# Patient Record
Sex: FEMALE | Race: WHITE | NOT HISPANIC OR LATINO | Employment: OTHER | URBAN - METROPOLITAN AREA
[De-identification: names, ages, dates, MRNs, and addresses within clinical notes are randomized per-mention and may not be internally consistent; named-entity substitution may affect disease eponyms.]

---

## 2021-12-04 ENCOUNTER — HOSPITAL ENCOUNTER (EMERGENCY)
Facility: MEDICAL CENTER | Age: 61
End: 2021-12-04
Attending: EMERGENCY MEDICINE

## 2021-12-04 ENCOUNTER — APPOINTMENT (OUTPATIENT)
Dept: RADIOLOGY | Facility: MEDICAL CENTER | Age: 61
End: 2021-12-04
Attending: EMERGENCY MEDICINE

## 2021-12-04 VITALS
OXYGEN SATURATION: 92 % | HEART RATE: 78 BPM | DIASTOLIC BLOOD PRESSURE: 77 MMHG | WEIGHT: 115.74 LBS | TEMPERATURE: 97 F | SYSTOLIC BLOOD PRESSURE: 124 MMHG | HEIGHT: 58 IN | RESPIRATION RATE: 16 BRPM | BODY MASS INDEX: 24.3 KG/M2

## 2021-12-04 DIAGNOSIS — K85.90 ACUTE PANCREATITIS, UNSPECIFIED COMPLICATION STATUS, UNSPECIFIED PANCREATITIS TYPE: ICD-10-CM

## 2021-12-04 LAB
ALBUMIN SERPL BCP-MCNC: 4.8 G/DL (ref 3.2–4.9)
ALBUMIN/GLOB SERPL: 1.8 G/DL
ALP SERPL-CCNC: 59 U/L (ref 30–99)
ALT SERPL-CCNC: 22 U/L (ref 2–50)
ANION GAP SERPL CALC-SCNC: 13 MMOL/L (ref 7–16)
APPEARANCE UR: CLEAR
AST SERPL-CCNC: 22 U/L (ref 12–45)
BACTERIA #/AREA URNS HPF: NEGATIVE /HPF
BASOPHILS # BLD AUTO: 0.2 % (ref 0–1.8)
BASOPHILS # BLD: 0.02 K/UL (ref 0–0.12)
BILIRUB SERPL-MCNC: 0.2 MG/DL (ref 0.1–1.5)
BILIRUB UR QL STRIP.AUTO: NEGATIVE
BUN SERPL-MCNC: 12 MG/DL (ref 8–22)
CALCIUM SERPL-MCNC: 9.4 MG/DL (ref 8.5–10.5)
CHLORIDE SERPL-SCNC: 104 MMOL/L (ref 96–112)
CO2 SERPL-SCNC: 22 MMOL/L (ref 20–33)
COLOR UR: YELLOW
CREAT SERPL-MCNC: 0.55 MG/DL (ref 0.5–1.4)
EOSINOPHIL # BLD AUTO: 0.01 K/UL (ref 0–0.51)
EOSINOPHIL NFR BLD: 0.1 % (ref 0–6.9)
EPI CELLS #/AREA URNS HPF: NEGATIVE /HPF
ERYTHROCYTE [DISTWIDTH] IN BLOOD BY AUTOMATED COUNT: 48.4 FL (ref 35.9–50)
GLOBULIN SER CALC-MCNC: 2.7 G/DL (ref 1.9–3.5)
GLUCOSE SERPL-MCNC: 119 MG/DL (ref 65–99)
GLUCOSE UR STRIP.AUTO-MCNC: NEGATIVE MG/DL
HCT VFR BLD AUTO: 41.4 % (ref 37–47)
HGB BLD-MCNC: 13.9 G/DL (ref 12–16)
HYALINE CASTS #/AREA URNS LPF: NORMAL /LPF
IMM GRANULOCYTES # BLD AUTO: 0.12 K/UL (ref 0–0.11)
IMM GRANULOCYTES NFR BLD AUTO: 1.1 % (ref 0–0.9)
KETONES UR STRIP.AUTO-MCNC: NEGATIVE MG/DL
LEUKOCYTE ESTERASE UR QL STRIP.AUTO: NEGATIVE
LIPASE SERPL-CCNC: 171 U/L (ref 11–82)
LYMPHOCYTES # BLD AUTO: 1.64 K/UL (ref 1–4.8)
LYMPHOCYTES NFR BLD: 14.7 % (ref 22–41)
MCH RBC QN AUTO: 32 PG (ref 27–33)
MCHC RBC AUTO-ENTMCNC: 33.6 G/DL (ref 33.6–35)
MCV RBC AUTO: 95.2 FL (ref 81.4–97.8)
MICRO URNS: ABNORMAL
MONOCYTES # BLD AUTO: 0.3 K/UL (ref 0–0.85)
MONOCYTES NFR BLD AUTO: 2.7 % (ref 0–13.4)
NEUTROPHILS # BLD AUTO: 9.06 K/UL (ref 2–7.15)
NEUTROPHILS NFR BLD: 81.2 % (ref 44–72)
NITRITE UR QL STRIP.AUTO: NEGATIVE
NRBC # BLD AUTO: 0 K/UL
NRBC BLD-RTO: 0 /100 WBC
PH UR STRIP.AUTO: 6.5 [PH] (ref 5–8)
PLATELET # BLD AUTO: 214 K/UL (ref 164–446)
PMV BLD AUTO: 10 FL (ref 9–12.9)
POTASSIUM SERPL-SCNC: 4.1 MMOL/L (ref 3.6–5.5)
PROT SERPL-MCNC: 7.5 G/DL (ref 6–8.2)
PROT UR QL STRIP: NEGATIVE MG/DL
RBC # BLD AUTO: 4.35 M/UL (ref 4.2–5.4)
RBC # URNS HPF: NORMAL /HPF
RBC UR QL AUTO: ABNORMAL
SODIUM SERPL-SCNC: 139 MMOL/L (ref 135–145)
SP GR UR STRIP.AUTO: 1.01
UROBILINOGEN UR STRIP.AUTO-MCNC: 0.2 MG/DL
WBC # BLD AUTO: 11.2 K/UL (ref 4.8–10.8)
WBC #/AREA URNS HPF: NORMAL /HPF

## 2021-12-04 PROCEDURE — 96374 THER/PROPH/DIAG INJ IV PUSH: CPT

## 2021-12-04 PROCEDURE — 74177 CT ABD & PELVIS W/CONTRAST: CPT

## 2021-12-04 PROCEDURE — 81001 URINALYSIS AUTO W/SCOPE: CPT

## 2021-12-04 PROCEDURE — 700111 HCHG RX REV CODE 636 W/ 250 OVERRIDE (IP): Performed by: EMERGENCY MEDICINE

## 2021-12-04 PROCEDURE — 83690 ASSAY OF LIPASE: CPT

## 2021-12-04 PROCEDURE — 80053 COMPREHEN METABOLIC PANEL: CPT

## 2021-12-04 PROCEDURE — 96376 TX/PRO/DX INJ SAME DRUG ADON: CPT

## 2021-12-04 PROCEDURE — 96375 TX/PRO/DX INJ NEW DRUG ADDON: CPT

## 2021-12-04 PROCEDURE — 700105 HCHG RX REV CODE 258: Performed by: EMERGENCY MEDICINE

## 2021-12-04 PROCEDURE — 85025 COMPLETE CBC W/AUTO DIFF WBC: CPT

## 2021-12-04 PROCEDURE — 99285 EMERGENCY DEPT VISIT HI MDM: CPT

## 2021-12-04 PROCEDURE — 700117 HCHG RX CONTRAST REV CODE 255: Performed by: EMERGENCY MEDICINE

## 2021-12-04 RX ORDER — ONDANSETRON 2 MG/ML
4 INJECTION INTRAMUSCULAR; INTRAVENOUS ONCE
Status: COMPLETED | OUTPATIENT
Start: 2021-12-04 | End: 2021-12-04

## 2021-12-04 RX ORDER — SODIUM CHLORIDE 9 MG/ML
1000 INJECTION, SOLUTION INTRAVENOUS ONCE
Status: COMPLETED | OUTPATIENT
Start: 2021-12-04 | End: 2021-12-04

## 2021-12-04 RX ORDER — MORPHINE SULFATE 4 MG/ML
4 INJECTION INTRAVENOUS ONCE
Status: COMPLETED | OUTPATIENT
Start: 2021-12-04 | End: 2021-12-04

## 2021-12-04 RX ADMIN — MORPHINE SULFATE 4 MG: 4 INJECTION INTRAVENOUS at 05:47

## 2021-12-04 RX ADMIN — IOHEXOL 80 ML: 350 INJECTION, SOLUTION INTRAVENOUS at 06:04

## 2021-12-04 RX ADMIN — SODIUM CHLORIDE 1000 ML: 9 INJECTION, SOLUTION INTRAVENOUS at 04:51

## 2021-12-04 RX ADMIN — ONDANSETRON 4 MG: 2 INJECTION INTRAMUSCULAR; INTRAVENOUS at 04:51

## 2021-12-04 RX ADMIN — MORPHINE SULFATE 4 MG: 4 INJECTION INTRAVENOUS at 04:51

## 2021-12-04 NOTE — ED PROVIDER NOTES
"ED Provider Note    CHIEF COMPLAINT  Chief Complaint   Patient presents with   • Abdominal Pain     RLQ pain x1 day; started as mild pain this AM and is now 9/10   • Nausea     since 2200; pt denies vomiting         HPI  Gifty Degroot is a 61 y.o. female who presents with abdominal pain.  Pain started yesterday morning.  Was present when she woke up.  Gradually worsened and is now severe sharp stabbing pain.  Worse when she straightens her leg or tries to walk.  Located right lower quadrant.  Does not radiate.  Associated nausea no vomiting.  Denies diarrhea.  Normal bowel movement.  No dysuria hematuria frequency.  No abnormal vaginal discharge or bleeding.    REVIEW OF SYSTEMS  As above  All other systems are negative.     PAST MEDICAL HISTORY  Past Medical History:   Diagnosis Date   • CAD (coronary artery disease)    • Chronic obstructive pulmonary disease ([facility])    • DDD (degenerative disc disease), lumbar    • Fibromyalgia    • NSTEMI (non-ST elevated myocardial infarction) ([facility])    • Renal disorder     Acute kidney failure from NSAID use   • Stenosis of cervical spine    • Stroke ([facility])     \"slightly more than a TIA\"; no lasting deficits   • Vertigo        FAMILY HISTORY  History reviewed. No pertinent family history.    SOCIAL HISTORY  Social History     Tobacco Use   • Smoking status: Former Smoker     Quit date:      Years since quittin.9   • Smokeless tobacco: Never Used   Vaping Use   • Vaping Use: Never used   Substance Use Topics   • Alcohol use: Yes     Comment: \"a single drink once a year\"   • Drug use: Yes     Types: Inhaled     Comment: marijuana       SURGICAL HISTORY  Past Surgical History:   Procedure Laterality Date   • STENT PLACEMENT  2016    cardiac stent x2   • CHOLECYSTECTOMY         CURRENT MEDICATIONS  Home Medications     Reviewed by Thalia Prado R.N. (Registered Nurse) on 21 at 0344  Med List Status: Not Addressed   Medication Last Dose Status      " "  Patient Herminio Taking any Medications                       ALLERGIES  Allergies   Allergen Reactions   • Nsaids Unspecified     Caused kidney failure   • Penicillins Hives   • Tramadol Hives       PHYSICAL EXAM  VITAL SIGNS: /81   Pulse (!) 118   Temp 36.9 °C (98.4 °F) (Temporal)   Resp 16   Ht 1.473 m (4' 10\")   Wt 52.5 kg (115 lb 11.9 oz)   SpO2 94%   BMI 24.19 kg/m²   Constitutional: Awake and alert  HENT: Dry mucous membranes  Eyes: Sclera white  Neck: Normal range of motion  Cardiovascular: Tachycardic  heart rate, Normal rhythm  Thorax & Lungs: Normal breath sounds, No respiratory distress, No wheezing, No chest tenderness.   Abdomen: Tenderness to palpation in the right side of the abdomen worse in the right lower quadrant.  Mild diffuse tenderness otherwise.  There is guarding.  Skin: No rash.   Back: No tenderness, No CVA tenderness.   Extremities: Intact, symmetric distal pulses, no edema.  Neurologic: Grossly normal    RADIOLOGY/PROCEDURES  CT-ABDOMEN-PELVIS WITH   Final Result         1.  Intrahepatic, common bile duct, and mild pancreatic ductal dilatation. Could correspond with postcholecystectomy physiology, however workup and evaluation for causes of biliary obstruction are recommended as clinically appropriate which could include    ERCP or MRCP.   2.  Hepatomegaly   3.  Atherosclerosis and atherosclerotic coronary artery disease         Imaging is interpreted by radiologist    Labs:   Results for orders placed or performed during the hospital encounter of 12/04/21   CBC WITH DIFFERENTIAL   Result Value Ref Range    WBC 11.2 (H) 4.8 - 10.8 K/uL    RBC 4.35 4.20 - 5.40 M/uL    Hemoglobin 13.9 12.0 - 16.0 g/dL    Hematocrit 41.4 37.0 - 47.0 %    MCV 95.2 81.4 - 97.8 fL    MCH 32.0 27.0 - 33.0 pg    MCHC 33.6 33.6 - 35.0 g/dL    RDW 48.4 35.9 - 50.0 fL    Platelet Count 214 164 - 446 K/uL    MPV 10.0 9.0 - 12.9 fL    Neutrophils-Polys 81.20 (H) 44.00 - 72.00 %    Lymphocytes 14.70 (L) " 22.00 - 41.00 %    Monocytes 2.70 0.00 - 13.40 %    Eosinophils 0.10 0.00 - 6.90 %    Basophils 0.20 0.00 - 1.80 %    Immature Granulocytes 1.10 (H) 0.00 - 0.90 %    Nucleated RBC 0.00 /100 WBC    Neutrophils (Absolute) 9.06 (H) 2.00 - 7.15 K/uL    Lymphs (Absolute) 1.64 1.00 - 4.80 K/uL    Monos (Absolute) 0.30 0.00 - 0.85 K/uL    Eos (Absolute) 0.01 0.00 - 0.51 K/uL    Baso (Absolute) 0.02 0.00 - 0.12 K/uL    Immature Granulocytes (abs) 0.12 (H) 0.00 - 0.11 K/uL    NRBC (Absolute) 0.00 K/uL   COMP METABOLIC PANEL   Result Value Ref Range    Sodium 139 135 - 145 mmol/L    Potassium 4.1 3.6 - 5.5 mmol/L    Chloride 104 96 - 112 mmol/L    Co2 22 20 - 33 mmol/L    Anion Gap 13.0 7.0 - 16.0    Glucose 119 (H) 65 - 99 mg/dL    Bun 12 8 - 22 mg/dL    Creatinine 0.55 0.50 - 1.40 mg/dL    Calcium 9.4 8.5 - 10.5 mg/dL    AST(SGOT) 22 12 - 45 U/L    ALT(SGPT) 22 2 - 50 U/L    Alkaline Phosphatase 59 30 - 99 U/L    Total Bilirubin 0.2 0.1 - 1.5 mg/dL    Albumin 4.8 3.2 - 4.9 g/dL    Total Protein 7.5 6.0 - 8.2 g/dL    Globulin 2.7 1.9 - 3.5 g/dL    A-G Ratio 1.8 g/dL   LIPASE   Result Value Ref Range    Lipase 171 (H) 11 - 82 U/L   URINALYSIS CULTURE, IF INDICATED    Specimen: Urine   Result Value Ref Range    Color Yellow     Character Clear     Specific Gravity 1.006 <1.035    Ph 6.5 5.0 - 8.0    Glucose Negative Negative mg/dL    Ketones Negative Negative mg/dL    Protein Negative Negative mg/dL    Bilirubin Negative Negative    Urobilinogen, Urine 0.2 Negative    Nitrite Negative Negative    Leukocyte Esterase Negative Negative    Occult Blood Trace (A) Negative    Micro Urine Req Microscopic    URINE MICROSCOPIC (W/UA)   Result Value Ref Range    WBC 0-2 /hpf    RBC 0-2 /hpf    Bacteria Negative None /hpf    Epithelial Cells Negative /hpf    Hyaline Cast 0-2 /lpf   ESTIMATED GFR   Result Value Ref Range    GFR If African American >60 >60 mL/min/1.73 m 2    GFR If Non African American >60 >60 mL/min/1.73 m 2        Medications   NS infusion 1,000 mL (0 mL Intravenous Stopped 12/4/21 0629)   morphine 4 mg/mL injection 4 mg (4 mg Intravenous Given 12/4/21 0451)   ondansetron (ZOFRAN) syringe/vial injection 4 mg (4 mg Intravenous Given 12/4/21 0451)   morphine 4 mg/mL injection 4 mg (4 mg Intravenous Given 12/4/21 0599)   iohexol (OMNIPAQUE) 350 mg/mL (IV) (80 mL Intravenous Given 12/4/21 0604)       Hydration: Patient was given IV fluids because of tachycardia and possible dehydration.  Oral fluids were not appropriate because of a possible surgical problem.  On recheck the patient was stable    COURSE & MEDICAL DECISION MAKING  Patient presents with abdominal pain.  Has significant tenderness.  Vital signs were stable.  Work-up was initiated.  Hydrated with saline.  Given morphine and Zofran for pain and nausea respectively.    Data returned showing mild leukocytosis.  CMP is unremarkable; however lipase is elevated in the diagnostic range for mild pancreatitis.  CT scan of the abdomen shows unspecified dilation of the intrahepatic and common bile duct.  There is a chance she could have an obstructive process resulting in pancreatitis as she is not a drinker.  I have recommended admission to the hospital for further evaluation.    The patient decided that she could not stay in the hospital.  She cited family emergency.  I spoke with her extensively about abnormal laboratory data and abnormal findings on CT scans.  She stated she would come back to the emergency department if her pain returns when the pain meds wore off.  If she does not end up coming back to the ER I have advised follow-up with primary provider for further evaluation.    FINAL IMPRESSION  1.  Abdominal pain  2.  Pancreatitis, mild  3.  Abnormal CT scan of the abdomen-dilated biliary ducts    Discharge AGAINST MEDICAL ADVICE    This dictation was created using voice recognition software. The accuracy of the dictation is limited to the abilities of the  software.  The nursing notes were reviewed and certain aspects of this information were incorporated into this note.    Electronically signed by: Marvin Florian M.D., 12/4/2021 4:34 AM

## 2021-12-04 NOTE — ED NOTES
"Pt reports \"my pain is better.\" When asked on a scale of 1-10 what pt's current pain is at, pt reports \"well it was off the charts earlier, now it's at a 10.\" Pt educated on pain scale, continues to report 10/10 pain. Pt resting in gurney, respirations even and unlabored, no apparent needs at this time.  "

## 2021-12-04 NOTE — ED TRIAGE NOTES
Chief Complaint   Patient presents with   • Abdominal Pain     RLQ pain x1 day; started as mild pain this AM and is now 9/10   • Nausea     since 2200; pt denies vomiting     Pt ambulatory to triage for above complaint. Pt has abdominal tenderness on palpation to right upper and lower quadrant. Positive for rebound tenderness to RLQ. Pt still has appendix. Charge RN notified of pt.    Pt is alert/oriented and follows commands. Pt speaking in full sentences and responds appropriately to questions. No acute distress noted in triage and respirations are even and unlabored.     Pt placed in lobby and educated on triage process. Pt encouraged to alert staff for any changes in condition.

## 2021-12-04 NOTE — ED NOTES
Pt wheeled from lobby to room, ambulatory with steady gait around room, agree with triage note, changed into gown, attached to monitor, chart up for ERP